# Patient Record
Sex: FEMALE | Race: WHITE
[De-identification: names, ages, dates, MRNs, and addresses within clinical notes are randomized per-mention and may not be internally consistent; named-entity substitution may affect disease eponyms.]

---

## 2021-04-11 ENCOUNTER — HOSPITAL ENCOUNTER (EMERGENCY)
Dept: HOSPITAL 56 - MW.ED | Age: 41
Discharge: HOME | End: 2021-04-11
Payer: MEDICAID

## 2021-04-11 DIAGNOSIS — O20.9: Primary | ICD-10-CM

## 2021-04-11 LAB
BUN SERPL-MCNC: 8 MG/DL (ref 7–18)
CHLORIDE SERPL-SCNC: 104 MMOL/L (ref 98–107)
CO2 SERPL-SCNC: 27.3 MMOL/L (ref 21–32)
GLUCOSE SERPL-MCNC: 105 MG/DL (ref 74–106)
POTASSIUM SERPL-SCNC: 3.7 MMOL/L (ref 3.5–5.1)
SODIUM SERPL-SCNC: 139 MMOL/L (ref 136–145)

## 2021-04-11 NOTE — EDM.PDOC
ED HPI GENERAL MEDICAL PROBLEM





- General


Chief Complaint: OB/GYN Problem


Stated Complaint: PREGNANT AND SPOTTING


Time Seen by Provider: 21 13:11


Source of Information: Reports: Patient


History Limitations: Reports: No Limitations





- History of Present Illness


INITIAL COMMENTS - FREE TEXT/NARRATIVE: 





Patient is a 41-year-old female who presents today for vaginal spotting.  P

atient dates that she believes she is pregnant she had positive practice at home

is not sure how far along she is.  Patient's been pregnant 11 times in the past 

has had 7 birth for by vaginal delivery 3 by .  Patient has any 

abdominal pain cramping or seeing any clots or tissue in the spotting today.  

Patient is now follow-up with GYN yet for this pregnancy.


  ** denies pain


Pain Score (Numeric/FACES): 0





- Related Data


                                    Allergies











Allergy/AdvReac Type Severity Reaction Status Date / Time


 


Yeast Allergy  Other Verified 21 13:26











Home Meds: 


                                    Home Meds





Pnv No.95/Ferrous Fum/Folic AC [Prenatal Caplet] 1 each PO 21 [History]











Past Medical History


HEENT History: Reports: None


Cardiovascular History: Reports: None


Respiratory History: Reports: None


Genitourinary History: Reports: None


OB/GYN History: Reports: Spontaneous 


Other OB/GYN History: 4 previous miscarriages.


Musculoskeletal History: Reports: None


Neurological History: Reports: None


Psychiatric History: Reports: None


Endocrine/Metabolic History: Reports: None


Hematologic History: Reports: None


Immunologic History: Reports: None


Oncologic (Cancer) History: Reports: None


Dermatologic History: Reports: None





- Past Surgical History


GI Surgical History: Reports: Cholecystectomy





Social & Family History





- Family History


Family Medical History: No Pertinent Family History





- Caffeine Use


Caffeine Use: Reports: Coffee, Energy Drinks, Soda





- Recreational Drug Use


Recreational Drug Use: No





ED ROS GENERAL





- Review of Systems


Review Of Systems: See Below


Constitutional: Reports: No Symptoms


HEENT: Reports: No Symptoms


Respiratory: Reports: No Symptoms


Cardiovascular: Reports: No Symptoms


Endocrine: Reports: No Symptoms


GI/Abdominal: Reports: No Symptoms


: Reports: Other (vaginal spotting)


Musculoskeletal: Reports: No Symptoms


Skin: Reports: No Symptoms


Neurological: Reports: No Symptoms


Psychiatric: Reports: No Symptoms


Hematologic/Lymphatic: Reports: No Symptoms


Immunologic: Reports: No Symptoms





ED EXAM PREGNANCY





- Physical Exam


Exam: See Below


Exam Limited By: No Limitations


General Appearance: Alert, WD/WN


Respiratory/Chest: No Respiratory Distress, Lungs Clear, Normal Breath Sounds


Cardiovascular: Normal Peripheral Pulses, Regular Rate, Rhythm, No Edema


GI/Abdominal Exam: Normal Bowel Sounds, Soft, Non-Tender


 (Female) Exam: Normal Bimanual Exam, Normal External Exam, Normal Speculum 

Exam.  No: Adnexal Mass (L), Adnexal Mass (R), Cervical Dilatation, Cervix 

Motion Tenderness


Neurological: Alert, Oriented





Course





- Vital Signs


Last Recorded V/S: 


                                Last Vital Signs











Temp  97 F   21 13:21


 


Pulse  80   21 16:00


 


Resp  16   21 16:00


 


BP  121/81   21 16:00


 


Pulse Ox  98   21 16:00














- Orders/Labs/Meds


Orders: 


                               Active Orders 24 hr











 Category Date Time Status


 


 CULTURE URINE [RM] Stat Lab  21 15:25 Received











Labs: 


                                Laboratory Tests











  21 Range/Units





  13:59 13:59 14:05 


 


WBC  9.01    (4.0-11.0)  K/uL


 


RBC  4.64    (4.30-5.90)  M/uL


 


Hgb  13.6    (12.0-16.0)  g/dL


 


Hct  40.7    (36.0-46.0)  %


 


MCV  87.7    (80.0-98.0)  fL


 


MCH  29.3    (27.0-32.0)  pg


 


MCHC  33.4    (31.0-37.0)  g/dL


 


RDW Std Deviation  43.2    (28.0-62.0)  fl


 


RDW Coeff of Zoey  13    (11.0-15.0)  %


 


Plt Count  299    (150-400)  K/uL


 


MPV  9.80    (7.40-12.00)  fL


 


Neut % (Auto)  65.9    (48.0-80.0)  %


 


Lymph % (Auto)  25.0    (16.0-40.0)  %


 


Mono % (Auto)  5.8    (0.0-15.0)  %


 


Eos % (Auto)  3.0    (0.0-7.0)  %


 


Baso % (Auto)  0.3    (0.0-1.5)  %


 


Neut # (Auto)  5.9 H    (1.4-5.7)  K/uL


 


Lymph # (Auto)  2.3    (0.6-2.4)  K/uL


 


Mono # (Auto)  0.5    (0.0-0.8)  K/uL


 


Eos # (Auto)  0.3    (0.0-0.7)  K/uL


 


Baso # (Auto)  0.0    (0.0-0.1)  K/uL


 


Nucleated RBC %  0.0    /100WBC


 


Nucleated RBCs #  0    K/uL


 


Sodium   139   (136-145)  mmol/L


 


Potassium   3.7   (3.5-5.1)  mmol/L


 


Chloride   104   ()  mmol/L


 


Carbon Dioxide   27.3   (21.0-32.0)  mmol/L


 


BUN   8   (7.0-18.0)  mg/dL


 


Creatinine   0.7   (0.6-1.0)  mg/dL


 


Est Cr Clr Drug Dosing   76.74   mL/min


 


Estimated GFR (MDRD)   > 60.0   ml/min


 


Glucose   105   ()  mg/dL


 


Calcium   8.9   (8.5-10.1)  mg/dL


 


Total Bilirubin   0.3   (0.2-1.0)  mg/dL


 


AST   9 L   (15-37)  IU/L


 


ALT   24   (14-63)  IU/L


 


Alkaline Phosphatase   72   ()  U/L


 


Total Protein   7.5   (6.4-8.2)  g/dL


 


Albumin   3.3 L   (3.4-5.0)  g/dL


 


Globulin   4.2 H   (2.6-4.0)  g/dL


 


Albumin/Globulin Ratio   0.8 L   (0.9-1.6)  


 


HCG, Quant   2159.0   mIU/mL


 


Urine Color     


 


Urine Appearance     


 


Urine pH     (5.0-8.0)  


 


Ur Specific Gravity     (1.001-1.035)  


 


Urine Protein     (NEGATIVE)  mg/dL


 


Urine Glucose (UA)     (NEGATIVE)  mg/dL


 


Urine Ketones     (NEGATIVE)  mg/dL


 


Urine Occult Blood     (NEGATIVE)  


 


Urine Nitrite     (NEGATIVE)  


 


Urine Bilirubin     (NEGATIVE)  


 


Urine Urobilinogen     (<2.0)  EU/dL


 


Ur Leukocyte Esterase     (NEGATIVE)  


 


Urine RBC     (0-2/HPF)  


 


Urine WBC     (0-5/HPF)  


 


Ur Epithelial Cells     (NONE-FEW)  


 


Urine Bacteria     (NEGATIVE)  


 


Urine Yeast     


 


Blood Type    O POSITIVE  














  04/11/21 Range/Units





  15:25 


 


WBC   (4.0-11.0)  K/uL


 


RBC   (4.30-5.90)  M/uL


 


Hgb   (12.0-16.0)  g/dL


 


Hct   (36.0-46.0)  %


 


MCV   (80.0-98.0)  fL


 


MCH   (27.0-32.0)  pg


 


MCHC   (31.0-37.0)  g/dL


 


RDW Std Deviation   (28.0-62.0)  fl


 


RDW Coeff of Zoey   (11.0-15.0)  %


 


Plt Count   (150-400)  K/uL


 


MPV   (7.40-12.00)  fL


 


Neut % (Auto)   (48.0-80.0)  %


 


Lymph % (Auto)   (16.0-40.0)  %


 


Mono % (Auto)   (0.0-15.0)  %


 


Eos % (Auto)   (0.0-7.0)  %


 


Baso % (Auto)   (0.0-1.5)  %


 


Neut # (Auto)   (1.4-5.7)  K/uL


 


Lymph # (Auto)   (0.6-2.4)  K/uL


 


Mono # (Auto)   (0.0-0.8)  K/uL


 


Eos # (Auto)   (0.0-0.7)  K/uL


 


Baso # (Auto)   (0.0-0.1)  K/uL


 


Nucleated RBC %   /100WBC


 


Nucleated RBCs #   K/uL


 


Sodium   (136-145)  mmol/L


 


Potassium   (3.5-5.1)  mmol/L


 


Chloride   ()  mmol/L


 


Carbon Dioxide   (21.0-32.0)  mmol/L


 


BUN   (7.0-18.0)  mg/dL


 


Creatinine   (0.6-1.0)  mg/dL


 


Est Cr Clr Drug Dosing   mL/min


 


Estimated GFR (MDRD)   ml/min


 


Glucose   ()  mg/dL


 


Calcium   (8.5-10.1)  mg/dL


 


Total Bilirubin   (0.2-1.0)  mg/dL


 


AST   (15-37)  IU/L


 


ALT   (14-63)  IU/L


 


Alkaline Phosphatase   ()  U/L


 


Total Protein   (6.4-8.2)  g/dL


 


Albumin   (3.4-5.0)  g/dL


 


Globulin   (2.6-4.0)  g/dL


 


Albumin/Globulin Ratio   (0.9-1.6)  


 


HCG, Quant   mIU/mL


 


Urine Color  YELLOW  


 


Urine Appearance  CLEAR  


 


Urine pH  6.0  (5.0-8.0)  


 


Ur Specific Gravity  1.015  (1.001-1.035)  


 


Urine Protein  NEGATIVE  (NEGATIVE)  mg/dL


 


Urine Glucose (UA)  NEGATIVE  (NEGATIVE)  mg/dL


 


Urine Ketones  NEGATIVE  (NEGATIVE)  mg/dL


 


Urine Occult Blood  LARGE H  (NEGATIVE)  


 


Urine Nitrite  NEGATIVE  (NEGATIVE)  


 


Urine Bilirubin  NEGATIVE  (NEGATIVE)  


 


Urine Urobilinogen  0.2  (<2.0)  EU/dL


 


Ur Leukocyte Esterase  SMALL H  (NEGATIVE)  


 


Urine RBC  1-3  (0-2/HPF)  


 


Urine WBC  5-10  (0-5/HPF)  


 


Ur Epithelial Cells  MANY  (NONE-FEW)  


 


Urine Bacteria  FEW  (NEGATIVE)  


 


Urine Yeast  RARE  


 


Blood Type   














- Re-Assessments/Exams


Free Text/Narrative Re-Assessment/Exam: 





21 17:09


Patient ultrasound labs reviewed.  Patient on exam os is closed.  Patient will 

be discharged follow-up with GYN as outpatient.





Departure





- Departure


Time of Disposition: 17:10


Disposition: Home, Self-Care 01


Condition: Good


Clinical Impression: 


 Haemorrhage complicating pregnancy, less than 22 weeks, antepartum








- Discharge Information


*PRESCRIPTION DRUG MONITORING PROGRAM REVIEWED*: Not Applicable


*COPY OF PRESCRIPTION DRUG MONITORING REPORT IN PATIENT NATHANIEL: Not Applicable


Instructions:  Vaginal Bleeding During Pregnancy, First Trimester, Easy-to-Read


Referrals: 


PCP,None [Primary Care Provider] - 


Forms:  ED Department Discharge


Additional Instructions: 


The following information is given to patients seen in the emergency department 

who are being discharged to home. This information is to outline your options 

for follow-up care. We provide all patients seen in our emergency department 

with a follow-up referral.





The need for follow-up, as well as the timing and circumstances, are variable 

depending upon the specifics of your emergency department visit.





If you don't have a primary care physician on staff, we will provide you with a 

referral. We always advise you to contact your personal physician following an 

emergency department visit to inform them of the circumstance of the visit and 

for follow-up with them and/or the need for any referrals to a consulting 

specialist.





The emergency department will also refer you to a specialist when appropriate. 

This referral assures that you have the opportunity for follow-up care with a 

specialist. All of these measure are taken in an effort to provide you with 

optimal care, which includes your follow-up.





Under all circumstances we always encourage you to contact your private 

physician who remains a resource for coordinating your care. When calling for 

follow-up care, please make the office aware that this follow-up is from your 

recent emergency room visit. If for any reason you are refused follow-up, please

contact the [facility] Emergency Department

at [phone] and asked to speak to the emergency department charge nurse.





Please follow up with your primary care physician. If you do not have a primary 

care physician, see below:








[facility]


[address] [place] [address]


Phone: [phone]


Fax: [phone]





[facility]


[address] [place] [address]


Phone: [phone]


Fax: [phone]








We performed labs and ultrasound that shows that you have a early pregnancy but 

we cannot confirm the correct position of the baby.  This because it may be too 

early we recommend you follow-up to get ultrasound you see your GYN doctor.  You

have any increased bleeding passing tissues or clots or abdominal pain please 

return to ED immediately.  Above we have listed to GYN clinic that you can fo

llow-up with.





Sepsis Event Note (ED)





- Evaluation


Sepsis Screening Result: No Definite Risk





- Focused Exam


Vital Signs: 


                                   Vital Signs











  Temp Pulse Resp BP Pulse Ox


 


 21 16:00   80  16  121/81  98


 


 21 13:21  97 F  90  18  120/79  97














- My Orders


Last 24 Hours: 


My Active Orders





21 15:25


CULTURE URINE [RM] Stat 














- Assessment/Plan


Last 24 Hours: 


My Active Orders





21 15:25


CULTURE URINE [RM] Stat 











Plan: 





Patient is a 41-year-old female who is a G7P 12 presents today for vaginal 

bleeding believe she is pregnant currently.  Will obtain a pregnancy test labs 

and ultrasound and reassess.

## 2021-04-11 NOTE — US
INDICATION:



Vaginal bleeding. First trimester ultrasound.



TECHNIQUE:



Real-time gray-scale imaging of the pelvis was performed transabdominally 

and endovaginally.



COMPARISON:



None.



FINDINGS:



In the central echogenic portion of the upper uterus, there is a fluid 

collection with a mean diameter of 7 mm which most likely represents a 

gestational sac. This size corresponds to an average ultrasound age 5 weeks 

and 3 days, compared to LMP dating of 8 weeks 2 days. There is no yolk sac 

and no embryo. 



Bilaterally, the ovaries show no abnormality. The right ovary measures 2.5 

x 1.5 x 0.9 cm and the left ovary measures 2.2 x 1.6 x 1.8 cm. No adnexal 

mass or free pelvic fluid.



IMPRESSION:



There is an intrauterine fluid collection with a mean diameter of 7 mm 

which most likely represents a gestational sac. There is no yolk sac and no 

embryo. The pregnancy is of uncertain viability based on this single 

ultrasound. Consider further evaluation with a follow-up ultrasound in 14 

days or more.



Dictated by Devyn Rodriguez MD @ Apr 11 2021  4:56PM



Signed by Dr. Devyn Rodriguez @ Apr 11 2021  5:02PM

## 2021-04-27 ENCOUNTER — HOSPITAL ENCOUNTER (OUTPATIENT)
Dept: HOSPITAL 56 - MW.SDS | Age: 41
Discharge: HOME | End: 2021-04-27
Attending: OBSTETRICS & GYNECOLOGY
Payer: MEDICAID

## 2021-04-27 DIAGNOSIS — Z91.02: ICD-10-CM

## 2021-04-27 DIAGNOSIS — E66.9: ICD-10-CM

## 2021-04-27 DIAGNOSIS — Z98.890: ICD-10-CM

## 2021-04-27 DIAGNOSIS — O03.9: Primary | ICD-10-CM

## 2021-04-27 PROCEDURE — 88305 TISSUE EXAM BY PATHOLOGIST: CPT

## 2021-04-27 PROCEDURE — 59820 CARE OF MISCARRIAGE: CPT

## 2021-04-27 PROCEDURE — 86850 RBC ANTIBODY SCREEN: CPT

## 2021-04-27 PROCEDURE — 86900 BLOOD TYPING SEROLOGIC ABO: CPT

## 2021-04-27 PROCEDURE — 86901 BLOOD TYPING SEROLOGIC RH(D): CPT

## 2021-04-27 PROCEDURE — 36415 COLL VENOUS BLD VENIPUNCTURE: CPT

## 2021-04-27 NOTE — OR
SURGEON:

SASHA FERRO MD

 

DATE OF PROCEDURE:  2021

 

PREOPERATIVE DIAGNOSIS:

9-week missed .

 

POSTOPERATIVE DIAGNOSIS:

9-week missed .

 

PROCEDURE:

Suction dilation and curettage.

 

PRIMARY SURGEON:

Sasha Ferro MD, present for the entire procedure.

 

ANESTHESIA:

LMA.

 

COMPLICATIONS:

None known.

 

ESTIMATED BLOOD LOSS:

50 mL.

 

INTRAVENOUS FLUIDS:

500 mL of crystalloid.

 

URINE OUTPUT:

30 mL, straight cath prior to the procedure.

 

FINDINGS:

An 8-week size anteverted uterus.

 

PATHOLOGY:

Products of conception.

 

INDICATIONS:

The patient is a 41-year-old  12, para 7-0-4-7 with last menstrual period

of 2021.  She presented to [facility] for followup

after visiting the emergency department due to spotting during the first

trimester.  At the clinic appointment, lab was obtained and a decrease in her

beta HCG was noted and spotting continued.  Ultrasound was performed, and fetal

pole was noted.  However, no cardiac activity confirming a missed  at 9

weeks' gestation.  At that appointment, the patient was noted to be positive for

gonorrhea and treated with Rocephin.  The patient was advised to await surgical

intervention until at least 1 week after treatment.  The patient presents to

preop today with continued vaginal spotting and small clots.  Denies pelvic

pain.  Desires to proceed with scheduled suction dilation and curettage after

informed consent was obtained.

 

PROCEDURE IN DETAIL:

The patient was taken to the operating room where anesthesia was induced.  She

was prepped and draped in the dorsal lithotomy position.  An open Graves

speculum was inserted into the vagina to visualize the cervix.  The bladder was

drained prior to the procedure.  A single-tooth tenaculum was then used to grasp

the cervix at 12 o'clock, and the cervix was then serially dilated to 10 Hegar.

A #9 curved suction curette was advanced to the fundus.  It was then attached to

suction and rotated to clear the uterus of products of conception.  A sharp

curettage was then performed until a gritty texture was noted.  A bedside

transabdominal ultrasound was then performed, and the uterus appeared to be

clear of products of conception.  Suction curettage was performed once more.

Minimal bleeding was noted.  The tenaculum was removed with good hemostasis.

All instruments were then removed from the vagina.  The patient tolerated the

procedure well.  Sponge, lap, and needle count were correct x2.  The patient

received 1 dose of doxycycline 200 mg prior to the procedure.  The patient was

taken to recovery in stable condition.

 

 

BRITTANIE ATWOOD

DD:  2021 10:12:39

DT:  2021 11:55:21

Job #:  [id number]

## 2021-04-27 NOTE — PCM.PREANE
Preanesthetic Assessment





- Anesthesia/Transfusion/Family Hx


Anesthesia History: Prior Anesthesia Without Reaction


Family History of Anesthesia Reaction: Other (see below)


Transfusion History: No Prior Transfusion(s)





- Review of Systems


General: No Symptoms


Pulmonary: No Symptoms


Cardiovascular: No Symptoms


Gastrointestinal: No Symptoms


Neurological: No Symptoms


Other: Reports: None





- Physical Assessment


NPO Status Date: 21


NPO Status Time: 00:01


Height: 5 ft


Weight: 194 lb


ASA Class: 2


Mental Status: Alert & Oriented x3


Airway Class: Mallampati = 2


Dentition: Reports: Normal Dentition


ROM/Head Extension: Full


Lungs: Clear to Auscultation, Normal Respiratory Effort


Cardiovascular: Regular Rate, Regular Rhythm





- Lab


Values: 





                             Laboratory Last Values











Blood Type  O POSITIVE   21  16:10    


 


Antibody Screen  NEGATIVE   21  16:10    














- Allergies


Allergies/Adverse Reactions: 


                                    Allergies











Allergy/AdvReac Type Severity Reaction Status Date / Time


 


Yeast Allergy  Stomach Verified 21 09:33





   Upset  














- Anesthesia Plan


Pre-Op Medication Ordered: None





- Acknowledgements


Anesthesia Type Planned: General Anesthesia


Pt an Appropriate Candidate for the Planned Anesthesia: Yes


Alternatives and Risks of Anesthesia Discussed w Pt/Guardian: Yes


Pt/Guardian Understands and Agrees with Anesthesia Plan: Yes


Additional Comments: 





npo after mn


tob none


etoh  rare


no cv rpoblems


G12








PreAnesthesia Questionnaire


HEENT History: Reports: None


Cardiovascular History: Reports: None


Respiratory History: Reports: None


Gastrointestinal History: Reports: None


Genitourinary History: Reports: None


OB/GYN History: Reports: Pregnancy, Spontaneous 


Other OB/BYN History: 4 previous miscarriages, c/section x3


Musculoskeletal History: Reports: Fracture


Other Musculoskeletal History: hx fx arm and finger


Neurological History: Reports: None


Psychiatric History: Reports: None


Endocrine/Metabolic History: Reports: Obesity/BMI 30+


Hematologic History: Reports: None


Immunologic History: Reports: None


Oncologic (Cancer) History: Reports: None


Dermatologic History: Reports: Eczema





- Past Surgical History


Head Surgeries/Procedures: Reports: None


HEENT Surgical History: Reports: None


Cardiovascular Surgical History: Reports: None


Respiratory Surgical History: Reports: None


GI Surgical History: Reports: Cholecystectomy


Female  Surgical History: Reports:  Section, D&C


Endocrine Surgical History: Reports: None


Neurological Surgical History: Reports: None


Musculoskeletal Surgical History: Reports: None


Oncologic Surgical History: Reports: None


Dermatological Surgical History: Reports: None





- SUBSTANCE USE


Tobacco Use Status *Q: Never Tobacco User





- HOME MEDS


Home Medications: 


                                    Home Meds





Pnv No.95/Ferrous Fum/Folic AC [Prenatal Caplet] 1 each PO DAILY 21 

[History]











- CURRENT (IN HOUSE) MEDS


Current Meds: 





                               Current Medications





Fentanyl (Fentanyl 100 Mcg/2 Ml Sdv)  50 mcg IVPUSH Q5M PRN


   PRN Reason: Pain


Lactated Ringer's (Ringers, Lactated)  1,000 mls @ 125 mls/hr IV ASDIRECTED [facility]


   Last Admin: 21 09:17 Dose:  125 mls/hr


   Documented by: 


Acetaminophen 1,000 mg/ Premix  100 mls @ 400 mls/hr IV Q6H PRN


   PRN Reason: Pain





Discontinued Medications





Doxycycline Hyclate (Doxycycline 100 Mg Cap)  200 mg PO ONETIME ONE


   Stop: 21 06:20


Doxycycline Hyclate (Doxycycline 100 Mg Cap) Confirm Administered Dose 200 mg 

.ROUTE .STK-MED ONE


   Stop: 21 09:18


Fentanyl (Fentanyl 100 Mcg/2 Ml Sdv) Confirm Administered Dose 100 mcg .ROUTE 

.STK-MED ONE


   Stop: 21 07:07


Glycopyrrolate (Glycopyrrolate 0.2 Mg/Ml Sdv) Confirm Administered Dose 0.2 mg 

.ROUTE .STK-MED ONE


   Stop: 21 07:07


Ketorolac Tromethamine (Ketorolac 30 Mg/Ml Sdv) Confirm Administered Dose 30 mg 

.ROUTE .STK-MED ONE


   Stop: 21 07:07


Lidocaine (Lidocaine 2% 5 Ml Sdv) Confirm Administered Dose 5 ml .ROUTE .STK-MED

 ONE


   Stop: 21 07:07


Midazolam HCl (Midazolam 1 Mg/Ml 2 Ml Sdv) Confirm Administered Dose 2 mg .ROUTE

 .STK-MED ONE


   Stop: 21 07:07


Ondansetron HCl (Ondansetron 4 Mg/2 Ml Sdv) Confirm Administered Dose 4 mg 

.ROUTE .STK-MED ONE


   Stop: 21 07:07


Propofol (Propofol 200 Mg/20 Ml Sdv) Confirm Administered Dose 200 mg .ROUTE 

.STK-MED ONE


   Stop: 21 07:06

## 2021-04-27 NOTE — PCM.POSTAN
POST ANESTHESIA ASSESSMENT





- MENTAL STATUS


Mental Status: Alert (no anesthetic problems), Oriented





- VITAL SIGNS


Vital Signs: 


                                Last Vital Signs











Temp  205.3 F H  04/27/21 09:56


 


Pulse  90   04/27/21 10:21


 


Resp  13   04/27/21 10:21


 


BP  101/65   04/27/21 10:21


 


Pulse Ox  94 L  04/27/21 10:21














- RESPIRATORY


Respiratory Status: Respiratory Rate WNL, Airway Patent, O2 Saturation Stable





- CARDIOVASCULAR


CV Status: Pulse Rate WNL, Blood Pressure Stable





- GASTROINTESTINAL


GI Status: No Symptoms





- POST OP HYDRATION


Hydration Status: Adequate & Stable

## 2021-04-27 NOTE — PCM.OPNOTE
- General Post-Op/Procedure Note


Date of Surgery/Procedure: 21


Operative Procedure(s): Suction dilation and curettage


Findings: 





8 week sized anteverted uterus


Pre Op Diagnosis: 9 week missed 


Post-Op Diagnosis: 9 week missed 


Anesthesia Technique: General LMA


Primary Surgeon: Paloma Ferro


Anesthesia Provider: Neto Maria


Pathology: 





Products of conception


Fluid Replacement, Intraop: 500 (crystalloid)


Output, Urine Amount: 30 (straight cath prior to procedure)


EBL in mLs: 50


Complications: None known


Condition: Good


Free Text/Narrative:: 


Dictation #[id number]